# Patient Record
(demographics unavailable — no encounter records)

---

## 2024-12-17 NOTE — ASSESSMENT
[FreeTextEntry1] : 32-year-old male with phimosis.  We discussed risk benefits and alternatives of circumcision versus steroid cream.  Patient wishes to try steroid prescription first.  Emergency room precautions given to patient in case of paraphimosis that he is not able to reduce on his own.    Today we discussed male circumcision which consists of the surgical removal of the foreskin of the penis including the most common complications Bleeding, Infection, Wound dehiscence.     [Phimosis (605\N47.1)] : transection of abdomen

## 2024-12-17 NOTE — HISTORY OF PRESENT ILLNESS
[FreeTextEntry1] : 32-year-old male consulting for inability to retract foreskin of the penis.  Condition has been apparent for since he was a teenager but now presenting more difficulty during erections, experiencing pain and paraphimosis with intercourse.  Able to reduce paraphimosis on his own.  No history of balanitis. Denies urinary symptoms, hematuria, bleeding from foreskin.

## 2025-03-29 NOTE — PHYSICAL EXAM
[Normal Appearance] : normal appearance [Well Groomed] : well groomed [General Appearance - In No Acute Distress] : no acute distress [] : no respiratory distress [Respiration, Rhythm And Depth] : normal respiratory rhythm and effort [Exaggerated Use Of Accessory Muscles For Inspiration] : no accessory muscle use [Abdomen Soft] : soft [Abdomen Tenderness] : non-tender [Costovertebral Angle Tenderness] : no ~M costovertebral angle tenderness [Urinary Bladder Findings] : the bladder was normal on palpation [Normal Station and Gait] : the gait and station were normal for the patient's age [No Focal Deficits] : no focal deficits [Oriented To Time, Place, And Person] : oriented to person, place, and time [Affect] : the affect was normal [Mood] : the mood was normal [Penis Abnormality] : normal uncircumcised penis [de-identified] : mild persistent phimosis

## 2025-03-29 NOTE — PHYSICAL EXAM
[Normal Appearance] : normal appearance [Well Groomed] : well groomed [General Appearance - In No Acute Distress] : no acute distress [] : no respiratory distress [Respiration, Rhythm And Depth] : normal respiratory rhythm and effort [Exaggerated Use Of Accessory Muscles For Inspiration] : no accessory muscle use [Abdomen Soft] : soft [Abdomen Tenderness] : non-tender [Costovertebral Angle Tenderness] : no ~M costovertebral angle tenderness [Urinary Bladder Findings] : the bladder was normal on palpation [Normal Station and Gait] : the gait and station were normal for the patient's age [No Focal Deficits] : no focal deficits [Oriented To Time, Place, And Person] : oriented to person, place, and time [Affect] : the affect was normal [Mood] : the mood was normal [Penis Abnormality] : normal uncircumcised penis [de-identified] : mild persistent phimosis

## 2025-03-29 NOTE — ASSESSMENT
[FreeTextEntry1] : 33-year-old male with phimosis.  We discussed risk benefits and alternatives of circumcision versus steroid cream.  Patient wishes to continue topical steroid.  Emergency room precautions given to patient in case of paraphimosis.  Today we discussed male circumcision which consists of the surgical removal of the foreskin of the penis including the most common complications Bleeding, Infection, Wound dehiscence.     [Phimosis (605\N47.1)] : transection of abdomen

## 2025-03-29 NOTE — HISTORY OF PRESENT ILLNESS
[FreeTextEntry1] : 33-year-old male consulting for inability to retract foreskin of the penis.  Condition has been apparent for since he was a teenager but now presenting more difficulty during erections, experiencing pain and paraphimosis with intercourse.  Able to reduce paraphimosis on his own.  No history of balanitis.  3/27/25 On steroidal cream for the last 3 months with improvement of phimosis.  No discomfort with intercourse/erections. No further paraphimosis episodes. Denies urinary symptoms, hematuria, bleeding from foreskin.